# Patient Record
(demographics unavailable — no encounter records)

---

## 2024-11-20 NOTE — PROCEDURE
[Large Joint Injection] : Large joint injection [Right] : of the right [Shoulder] : shoulder [Pain] : pain [Alcohol] : alcohol [Ethyl Chloride sprayed topically] : ethyl chloride sprayed topically [Sterile technique used] : sterile technique used [____] : [unfilled] [] : Patient tolerated procedure well [Call if redness, pain or fever occur] : call if redness, pain or fever occur [Apply ice for 15min out of every hour for the next 12-24 hours as tolerated] : apply ice for 15 minutes out of every hour for the next 12-24 hours as tolerated [Patient was advised to rest the joint(s) for ____ days] : patient was advised to rest the joint(s) for [unfilled] days [Risks, benefits, alternatives discussed / Verbal consent obtained] : the risks benefits, and alternatives have been discussed, and verbal consent was obtained

## 2024-11-20 NOTE — DISCUSSION/SUMMARY
[de-identified] : Chief complaint: Right shoulder pain  HPI: Patient is a 45-year-old right-hand-dominant male who presents the office today for evaluation of right shoulder pain which manifested yesterday, 11/19/2024.  He reports that he was at the gym bench pressing at which time he felt versus heard a pop in the right shoulder.  This was followed by pain to the right shoulder.  Since that time he has had pain to the right shoulder with range of motion.  Reports that the pain is associated with weakness and limited range of motion of the right shoulder.  Reports taking Tylenol with no significant relief of his discomfort.  Also reports applying IcyHot without any significant relief.  Denies any previous shoulder injury or surgery.  ROS: Positive for right shoulder pain  Physical examination of the right shoulder shows: No appreciable edema No palpable masses Active forward flexion from 0 to 45 degrees, passive forward flexion from 0 to 160 degrees Active horizontal abduction from 0 to 30 degrees, passive horizontal abduction from 0 to 150 degrees Internal rotation to L3 lumbar level positive O'Briens Test positive Speed's negative Casiano / Raoul Impingement Test positive Neer's Test positive pain and weakness with empty Can test Pain and weakness with infraspinatus/teres minor muscle testing Negative drop arm  Three-view x-rays of the right shoulder performed in the office today show no obvious acute displaced fracture, subluxation, or dislocation, soft tissue calcification noted superior to the humeral head and inferior to the acromion  Assessment/plan: Injury of the right shoulder, discussed treatment options with the patient, A prescription for nabumetone 750 mg as needed twice daily with food was sent to the patient's pharmacy, confirmed no contraindications to NSAIDS. Patient denies being on a blood thinner. Denies history of GIB or GI ulcer.  Discussed in detail with the patient that they cannot take over-the-counter NSAIDs including but not limited to ibuprofen, Advil, Aleve, or Motrin while taking this medication.  They can continue to take over-the-counter Tylenol.  Patient can apply ice or heat to the right shoulder on an as-needed basis with sensory precautions  Patient was provided with a prescription for formal physical therapy so that he can get started on that, discussed activity modifications with the patient in detail and at length  Discussed the possibility of an intra-articular corticosteroid injection of the right shoulder today, discussed all associated risks and benefits including but not limited to no improvement with injection, increased pain with injection, sepsis, infection to the joint, hypo-/hyperpigmentation of the skin, patient verbalized understanding of all associated risks and benefits and decided to proceed with the procedure, verbal consent obtained, procedure note attached, patient tolerated the procedure well, post care instructions discussed in detail with the patient  Patient will be provided with a 6-week follow-up with Dr. Cortez per his request, he verbalized understanding of all findings in the office today and agrees to follow-up as directed